# Patient Record
Sex: MALE | Race: BLACK OR AFRICAN AMERICAN | HISPANIC OR LATINO | Employment: UNEMPLOYED | URBAN - METROPOLITAN AREA
[De-identification: names, ages, dates, MRNs, and addresses within clinical notes are randomized per-mention and may not be internally consistent; named-entity substitution may affect disease eponyms.]

---

## 2020-01-10 ENCOUNTER — OFFICE VISIT (OUTPATIENT)
Dept: URGENT CARE | Facility: CLINIC | Age: 50
End: 2020-01-10
Payer: COMMERCIAL

## 2020-01-10 VITALS
TEMPERATURE: 98.6 F | HEIGHT: 65 IN | SYSTOLIC BLOOD PRESSURE: 149 MMHG | HEART RATE: 84 BPM | WEIGHT: 201 LBS | DIASTOLIC BLOOD PRESSURE: 77 MMHG | RESPIRATION RATE: 18 BRPM | BODY MASS INDEX: 33.49 KG/M2 | OXYGEN SATURATION: 97 %

## 2020-01-10 DIAGNOSIS — K40.90 RIGHT INGUINAL HERNIA: Primary | ICD-10-CM

## 2020-01-10 PROCEDURE — 99213 OFFICE O/P EST LOW 20 MIN: CPT | Performed by: PHYSICIAN ASSISTANT

## 2020-01-10 NOTE — PROGRESS NOTES
[facility]        NAME: Tom Garay is a 47 y o  male  : 1970    MRN: [id number]  DATE: January 10, 2020  TIME: 2:45 PM    Assessment and Plan   Right inguinal hernia [K40 90]  1  Right inguinal hernia       Patient Instructions     Advised ice, heat, tylenol, or motrin for discomfort  Pressure application to groin with physical activity or straining  Follow up with PCP or general surgeon in 3-5 days  Proceed to  ER if symptoms worsen  The dx, etiology, and tx plan was reviewed with pt and his significant other  All questions answered  Precautions given  Chief Complaint     Chief Complaint   Patient presents with    Abdominal Pain     RLQ abdominal pain x1 week, denies any N/V/D  Denies any urinary S/S   RLQ painful to touch with pain radiating to the leg     History of Present Illness     48 y/o male presenting with c/o right groin pain x 1 week  Pain is described as a tightness that is constant with intermittent exacerbations  No known cause for exacerbations  Pain does intermittently radiates to the right thigh and into the back  No known cause for radiation  Pain is relieved with drinking water  No other relieving or exacerbating factors  Denies dysuria, urgency, or frequency  Denies change in pain with BM, straining, coughing, walking, sitting, or lying down  No overlying skin abnormality, swelling, or deformity  No abdominal pain, N/V/D  No numbness, tingling, or weakness  No prior occurrence  No known injury  Review of Systems   Review of Systems   Constitutional: Negative for chills, diaphoresis and fever  Respiratory: Negative for cough and wheezing  Cardiovascular: Negative for chest pain and palpitations  Gastrointestinal: Negative for abdominal pain, diarrhea, nausea and vomiting  Genitourinary: Negative for discharge, dysuria, frequency, scrotal swelling, testicular pain and urgency  Skin: Negative for rash     Neurological: Negative for light-headedness and headaches  Current Medications     No current outpatient medications on file  Current Allergies     Allergies as of 01/10/2020    (No Known Allergies)          The following portions of the patient's history were reviewed and updated as appropriate: allergies, current medications, past family history, past medical history, past social history, past surgical history and problem list      History reviewed  No pertinent past medical history  History reviewed  No pertinent surgical history  History reviewed  No pertinent family history  Medications have been verified  Objective   /77   Pulse 84   Temp 98 6 °F (37 °C)   Resp 18   Ht 5' 5" (1 651 m)   Wt 91 2 kg (201 lb)   SpO2 97%   BMI 33 45 kg/m²      Physical Exam     Physical Exam   Constitutional: Vital signs are normal  He appears well-developed and well-nourished  He is cooperative  He does not appear ill  No distress  HENT:   Head: Normocephalic and atraumatic  Eyes: Conjunctivae and lids are normal  Right eye exhibits no chemosis, no discharge and no exudate  Left eye exhibits no chemosis, no discharge and no exudate  Right conjunctiva is not injected  Left conjunctiva is not injected  Cardiovascular: Normal rate, regular rhythm and normal heart sounds  Exam reveals no distant heart sounds  Pulmonary/Chest: Effort normal and breath sounds normal  No stridor  No respiratory distress  He has no decreased breath sounds  He has no wheezes  He has no rhonchi  He has no rales  Abdominal: Soft  Bowel sounds are normal  He exhibits no distension and no mass  There is no tenderness  There is no rigidity, no rebound and no guarding  A hernia is present  Hernia confirmed positive in the right inguinal area  Hernia confirmed negative in the left inguinal area  Genitourinary: Testes normal and penis normal  Cremasteric reflex is present  No penile erythema or penile tenderness  No discharge found     Lymphadenopathy: No inguinal adenopathy noted on the right or left side  Neurological: He is alert  He is not disoriented  No cranial nerve deficit  Coordination and gait normal    Skin: Skin is warm, dry and intact  No rash noted  He is not diaphoretic  No erythema  No pallor  Psychiatric: He has a normal mood and affect  His behavior is normal  Judgment and thought content normal    Nursing note and vitals reviewed

## 2020-01-10 NOTE — PATIENT INSTRUCTIONS
Follow up with your family doctor in 3-5 days  Proceed to the ER if symptoms are become constant or worsen  Hernia inguinal   LO QUE NECESITA SABER:   Sharee hernia inguinal ocurre cuando los órganos o el tejido abdominal se forzan a través de un área débil de la pared abdominal y pasan a través de ésta  La pared abdominal está formada de grasa y músculo  Eso mantiene a los intestinos en guthrie lugar  La hernia podría contener líquido, tejido del abdomen o parte de un órgano (brad un intestino)  INSTRUCCIONES SOBRE EL MARILEE HOSPITALARIA:   Regrese a la deyanira de emergencias si:   · Usted tiene un intenso dolor abdominal con náuseas y vómitos  · Guthrie abdomen está más prince de lo normal      · Guthrie hernia se hace más prince o se ve morada o john  · Usted ve tracey en mariano deposiciones  · Usted se siente mareado, débil o tiene sensación de [place]  Pregúntele a guthrie Niharika Alpha vitaminas y minerales son adecuados para usted  · Usted tiene fiebre  · Usted tiene preguntas o inquietudes acerca de guthrie condición o cuidado  Medicamento:  Usted podría  necesitar lo siguiente:  · AINEs (Analgésicos antiinflamatorios no esteroides) brad el ibuprofeno, ayudan a disminuir la inflamación, el dolor y la fiebre  Los AINEs pueden causar sangrado estomacal o problemas renales en ciertas personas  Si usted amara un medicamento anticoagulante, siempre pregúntele a guthrie médico si los PEPE son seguros para usted  Siempre joan la etiqueta de trey medicamento y [place] instrucciones  · [place] mariano medicamentos brad se le haya indicado  Consulte con guthrie médico si usted saba que guthrie medicamento no le está ayudando o si presenta efectos secundarios  Infórmele si es alérgico a cualquier medicamento  Mantenga sharee lista actualizada de los [place], las vitaminas y los productos herbales que amara  Incluya los siguientes datos de los medicamentos: cantidad, frecuencia y motivo de administración   Ermalinda Griffon con usted la lista o los envases de la píldoras a mariano citas de seguimiento  Lleve la lista de los medicamentos con usted en simone de sharee emergencia  Acuda a mariano consultas de control con marlow médico según le indicaron  Anote mariano preguntas para que se acuerde de hacerlas regino mariano visitas  Controle mariano síntomas y evite otra hernia:   · No levante nada pesado  El levantar cosas pesadas puede empeorar marlow hernia o provocar otra  Consulte con marlow médico cuánto peso puede usted levantar sin riesgo  · [address] se le haya indicado  Los líquidos podrían evitar el estreñimiento y el esfuerzo regino sharee evacuación intestinal  Pregunte cuánto líquido debe danyel cada día y cuáles líquidos son los más adecuados para usted  · [place] alimentos ricos en fibras  La fibra podría evitar el estreñimiento y el esfuerzo regino sharee evacuación intestinal  Los alimentos que contienen fibra incluyen a las frutas, verduras, frijoles, lentejas y granos enteros  · Mantenga un peso saludable  Si usted tiene sobrepeso, la pérdida de peso podría evitar que marlow hernia empeore  También podría evitar el desarrollo de otra hernia  Hable con marlow médico acerca del ejercicio y cómo perder peso de manera [place]bell si tiene sobrepeso  · No fume  La nicotina y otros químicos en los cigarrillos y los cigarros pueden debilitar marlow pared abdominal  [place] podría aumentar marlow riesgo de desarrollar otra hernia  Pida información a marlow médico si usted actualmente fuma y necesita ayuda para dejar de fumar  Los cigarrillos electrónicos o tabaco sin humo todavía contienen nicotina  Consulte con marlow médico antes de [employer]  © 2017 [address] Information is for End User's use only and may not be sold, redistributed or otherwise used for commercial purposes  All illustrations and images included in CareNotes® are the copyrighted property of A D A M , Inc  or Hugh Leone  Esta información es sólo para uso en educación   Marlow intención no es darle un consejo médico sobre enfermedades o tratamientos  Colsulte con guthrie Kermitt Console farmacéutico antes de seguir cualquier régimen médico para saber si es seguro y efectivo para usted

## 2020-11-03 ENCOUNTER — OFFICE VISIT (OUTPATIENT)
Dept: FAMILY MEDICINE CLINIC | Facility: CLINIC | Age: 50
End: 2020-11-03
Payer: COMMERCIAL

## 2020-11-03 VITALS
BODY MASS INDEX: 34.33 KG/M2 | WEIGHT: 213.6 LBS | TEMPERATURE: 97.3 F | OXYGEN SATURATION: 98 % | RESPIRATION RATE: 18 BRPM | HEART RATE: 72 BPM | HEIGHT: 66 IN | SYSTOLIC BLOOD PRESSURE: 132 MMHG | DIASTOLIC BLOOD PRESSURE: 86 MMHG

## 2020-11-03 DIAGNOSIS — Z23 ENCOUNTER FOR IMMUNIZATION: ICD-10-CM

## 2020-11-03 DIAGNOSIS — E66.9 CLASS 1 OBESITY WITH BODY MASS INDEX (BMI) OF 34.0 TO 34.9 IN ADULT, UNSPECIFIED OBESITY TYPE, UNSPECIFIED WHETHER SERIOUS COMORBIDITY PRESENT: ICD-10-CM

## 2020-11-03 DIAGNOSIS — Z12.11 SCREENING FOR COLON CANCER: Primary | ICD-10-CM

## 2020-11-03 PROCEDURE — 3008F BODY MASS INDEX DOCD: CPT | Performed by: FAMILY MEDICINE

## 2020-11-03 PROCEDURE — 90715 TDAP VACCINE 7 YRS/> IM: CPT

## 2020-11-03 PROCEDURE — 3725F SCREEN DEPRESSION PERFORMED: CPT | Performed by: FAMILY MEDICINE

## 2020-11-03 PROCEDURE — 99213 OFFICE O/P EST LOW 20 MIN: CPT | Performed by: FAMILY MEDICINE

## 2020-11-03 PROCEDURE — 90471 IMMUNIZATION ADMIN: CPT

## 2020-11-04 ENCOUNTER — TELEPHONE (OUTPATIENT)
Dept: GASTROENTEROLOGY | Facility: AMBULARY SURGERY CENTER | Age: 50
End: 2020-11-04

## 2022-11-22 ENCOUNTER — OFFICE VISIT (OUTPATIENT)
Dept: FAMILY MEDICINE CLINIC | Facility: CLINIC | Age: 52
End: 2022-11-22

## 2022-11-22 VITALS
TEMPERATURE: 97.2 F | WEIGHT: 220.13 LBS | DIASTOLIC BLOOD PRESSURE: 62 MMHG | BODY MASS INDEX: 35.38 KG/M2 | SYSTOLIC BLOOD PRESSURE: 128 MMHG | HEIGHT: 66 IN | OXYGEN SATURATION: 100 % | RESPIRATION RATE: 21 BRPM | HEART RATE: 75 BPM

## 2022-11-22 DIAGNOSIS — B35.3 TINEA PEDIS OF BOTH FEET: ICD-10-CM

## 2022-11-22 DIAGNOSIS — G44.209 TENSION HEADACHE: Primary | ICD-10-CM

## 2022-11-22 RX ORDER — MELOXICAM 7.5 MG/1
7.5 TABLET ORAL DAILY PRN
Qty: 30 TABLET | Refills: 0 | Status: SHIPPED | OUTPATIENT
Start: 2022-11-22

## 2022-11-22 NOTE — PROGRESS NOTES
Name: Deena Nichols      : 1970      MRN: [id number]  Encounter Provider: Dottie Mayes MD  Encounter Date: 2022   Encounter department: [facility]     1  Tension headache  -     meloxicam (Mobic) 7 5 mg tablet; Take 1 tablet (7 5 mg total) by mouth daily as needed for moderate pain Take with food or milk    2  Tinea pedis of both feet  -     terbinafine (LamISIL) 1 % cream; Apply topically 2 (two) times a day      Prescribed patient meloxicam p r n  for headaches given GI side effects after use with aspirin  Will monitor response and follow up in 2 weeks  Terbinafine cream prescribed for athlete's foot of both feet bilaterally  Will monitor resolution in 2 weeks, consider p o  anti-fungal if issue persists  Subjective      HPI   Headache in back of head for months  Worse when he drives  Works as a tumor   Tried Tylenol and aspirin, reports abdominal pain after ingestion  Yellowness in feet  Took a few penicillin antibiotics that have improved feet  Review of Systems   Constitutional: Negative for chills and fever  HENT: Negative for sore throat  Respiratory: Negative for cough and shortness of breath  Cardiovascular: Negative for chest pain and palpitations  Gastrointestinal: Negative for abdominal pain, constipation, diarrhea, nausea and vomiting  Genitourinary: Negative for difficulty urinating and dysuria  Neurological: Positive for headaches  Negative for dizziness  No current outpatient medications on file prior to visit  Objective     /62 (BP Location: Left arm, Patient Position: Sitting, Cuff Size: Large)   Pulse 75   Temp (!) 97 2 °F (36 2 °C) (Tympanic)   Resp 21   Ht 5' 6" (1 676 m)   Wt 99 8 kg (220 lb 2 oz)   SpO2 100%   BMI 35 53 kg/m²     Physical Exam  Constitutional:       Appearance: Normal appearance  HENT:      Head: Normocephalic and atraumatic     Cardiovascular: Rate and Rhythm: Normal rate and regular rhythm  Heart sounds: Normal heart sounds  No murmur heard  Pulmonary:      Breath sounds: Normal breath sounds  No wheezing  Abdominal:      Palpations: Abdomen is soft  Tenderness: There is no abdominal tenderness  Musculoskeletal:      Right lower leg: No edema  Left lower leg: No edema  Feet:      Right foot:      Toenail Condition: Fungal disease present  Left foot:      Toenail Condition: Fungal disease present  Neurological:      Mental Status: He is alert         Rosemarie Bowden MD

## 2023-01-06 ENCOUNTER — OFFICE VISIT (OUTPATIENT)
Dept: FAMILY MEDICINE CLINIC | Facility: CLINIC | Age: 53
End: 2023-01-06

## 2023-01-06 VITALS
RESPIRATION RATE: 18 BRPM | WEIGHT: 217.4 LBS | DIASTOLIC BLOOD PRESSURE: 64 MMHG | OXYGEN SATURATION: 100 % | SYSTOLIC BLOOD PRESSURE: 142 MMHG | HEIGHT: 66 IN | TEMPERATURE: 97.6 F | BODY MASS INDEX: 34.94 KG/M2 | HEART RATE: 74 BPM

## 2023-01-06 DIAGNOSIS — R07.9 CHEST PAIN, UNSPECIFIED TYPE: ICD-10-CM

## 2023-01-06 DIAGNOSIS — Z12.11 COLON CANCER SCREENING: ICD-10-CM

## 2023-01-06 DIAGNOSIS — G44.209 TENSION HEADACHE: ICD-10-CM

## 2023-01-06 DIAGNOSIS — E66.9 CLASS 2 OBESITY WITH BODY MASS INDEX (BMI) OF 35.0 TO 35.9 IN ADULT, UNSPECIFIED OBESITY TYPE, UNSPECIFIED WHETHER SERIOUS COMORBIDITY PRESENT: Primary | ICD-10-CM

## 2023-01-06 RX ORDER — IBUPROFEN 400 MG/1
800 TABLET ORAL EVERY 8 HOURS PRN
Qty: 90 TABLET | Refills: 0 | Status: SHIPPED | OUTPATIENT
Start: 2023-01-06

## 2023-01-06 RX ORDER — ACETAMINOPHEN 500 MG
1000 TABLET ORAL EVERY 6 HOURS PRN
Qty: 90 TABLET | Refills: 0 | Status: SHIPPED | OUTPATIENT
Start: 2023-01-06 | End: 2023-02-05

## 2023-01-06 NOTE — PATIENT INSTRUCTIONS
Headache treatment    - when you have a headache take up to 1 gram (or 1000 milligrams) of Tylenol/Acetaminophen (do not exceed 4 grams in one day) and up to 800 mg of Motrin/Ibuprofen (do not exceed 2400 mg in one day)  Both medications can be taken at the same time  Take with food  Obesity   AMBULATORY CARE:   Obesity  means your body mass index (BMI) is greater than 30  Your healthcare provider will use your height and weight to measure your BMI  The risks of obesity include  many health problems, including injuries or physical disability  Diabetes (high blood sugar level)    High blood pressure or high cholesterol    Heart disease    Stroke    Gallbladder or liver disease    Cancer of the colon, breast, prostate, liver, or kidney    Sleep apnea    Arthritis or gout    Screening  is done to check for health conditions before you have signs or symptoms  If you are 28to 79years old, your blood sugar level may be checked every 3 years for signs of prediabetes or diabetes  Your healthcare provider will check your blood pressure at each visit  High blood pressure can lead to a stroke or other problems  Your provider may check for signs of heart disease, cancer, or other health problems  Seek care immediately if:   You have a severe headache, confusion, or difficulty speaking  You have weakness on one side of your body  You have chest pain, sweating, or shortness of breath  Call your doctor if:   You have symptoms of gallbladder or liver disease, such as pain in your upper abdomen  You have knee or hip pain and discomfort while walking  You have symptoms of diabetes, such as intense hunger and thirst, and frequent urination  You have symptoms of sleep apnea, such as snoring or daytime sleepiness  You have questions or concerns about your condition or care  Treatment for obesity  focuses on helping you lose weight to improve your health   Even a small decrease in BMI can reduce the risk for many health problems  Your healthcare provider will help you set a weight-loss goal   Lifestyle changes  are the first step in treating obesity  These include making healthy food choices and getting regular physical activity  Your healthcare provider may suggest a weight-loss program that involves coaching, education, and therapy  Medicine  may help you lose weight when it is used with a healthy foods and physical activity  Surgery  can help you lose weight if you are very obese and have other health problems  There are several types of weight-loss surgery  Ask your healthcare provider for more information  Tips for safe weight loss:   Set small, realistic goals  An example of a small goal is to walk for 20 minutes 5 days a week  Anther goal is to lose 5% of your body weight  Tell friends, family members, and coworkers about your goals  and ask for their support  Ask a friend to lose weight with you, or join a weight-loss support group  Identify foods or triggers that may cause you to overeat , and find ways to avoid them  Remove tempting high-calorie foods from your home and workplace  Place a bowl of fresh fruit on your kitchen counter  If stress causes you to eat, then find other ways to cope with stress  A counselor or therapist may be able to help you  Keep a diary to track what you eat and drink  Also write down how many minutes of physical activity you do each day  Weigh yourself once a week and record it in your diary  Eating changes: You will need to eat 500 to 1,000 fewer calories each day than you currently eat to lose 1 to 2 pounds a week  The following changes will help you cut calories:  Eat smaller portions  Use small plates, no larger than 9 inches in diameter  Fill your plate half full of fruits and vegetables  Measure your food using measuring cups until you know what a serving size looks like  Eat 3 meals and 1 or 2 snacks each day    Plan your meals in advance  Marielena Mondraogn and eat at home most of the time  Eat slowly  Do not skip meals  Skipping meals can lead to overeating later in the day  This can make it harder for you to lose weight  Talk with a dietitian to help you make a meal plan and schedule that is right for you  Eat fruits and vegetables at every meal   They are low in calories and high in fiber, which makes you feel full  Do not add butter, margarine, or cream sauce to vegetables  Use herbs to season steamed vegetables  Eat less fat and fewer fried foods  Eat more baked or grilled chicken and fish  These protein sources are lower in calories and fat than red meat  Limit fast food  Dress your salads with olive oil and vinegar instead of bottled dressing  Limit the amount of sugar you eat  Do not drink sugary beverages  Limit alcohol  Activity changes:  Physical activity is good for your body in many ways  It helps you burn calories and build strong muscles  It decreases stress and depression, and improves your mood  It can also help you sleep better  Talk to your healthcare provider before you begin an exercise program   Exercise for at least 30 minutes 5 days a week  Start slowly  Set aside time each day for physical activity that you enjoy and that is convenient for you  It is best to do both weight training and an activity that increases your heart rate, such as walking, bicycling, or swimming  Find ways to be more active  Do yard work and housecleaning  Walk up the stairs instead of using elevators  Spend your leisure time going to events that require walking, such as outdoor festivals or fairs  This extra physical activity can help you lose weight and keep it off  Follow up with your doctor as directed: You may need to meet with a dietitian  Write down your questions so you remember to ask them during your visits    © Copyright [employer] 2022 Information is for End User's use only and may not be sold, redistributed or otherwise used for commercial purposes  All illustrations and images included in CareNotes® are the copyrighted property of A D A M , Inc  or Dayne [address]  The above information is an  only  It is not intended as medical advice for individual conditions or treatments  Talk to your doctor, nurse or pharmacist before following any medical regimen to see if it is safe and effective for you

## 2023-01-06 NOTE — PROGRESS NOTES
[facility] Office visit    Assessment/Plan:     1  Class 2 obesity with body mass index (BMI) of 35 0 to 35 9 in adult, unspecified obesity type, unspecified whether serious comorbidity present    No formal dieting or regular exercise  Counseling and handout provided    -     Lipid Panel with Direct LDL reflex; Future  -     Basic metabolic panel; Future  -     HEMOGLOBIN A1C W/ EAG ESTIMATION; Future  -     Ambulatory Referral to Nutrition Services; Future      2  Colon cancer screening  -     Ambulatory referral for colonoscopy; Future    3  Tension headache          -     acetaminophen (TYLENOL) 500 mg tablet; Take 2 tablets (1,000 mg total) by mouth every 6 (six) hours as needed for headaches  -     ibuprofen (MOTRIN) 400 mg tablet; Take 2 tablets (800 mg total) by mouth every 8 (eight) hours as needed for headaches    4  Chest pain, unspecified type  -     Stress test only, exercise; Future; Expected date: 01/06/2023          Return in about 4 weeks (around 2/3/2023) for OVL folluw up headaches, obesity, chest pain  Subjective:   OCTAVIA Vaca is a 47 y o  male who was scheduled for a physical, but we opted to postpone that to address multiple issues  He reports right occipital headache lasting about 1 hr, 3 times per week, without associated sensitivity to light or sound or visual disturbances  He's not sure what triggers them, he has tried mobic with partial relief  He also complains of left-sided chest pain that has been going on for several months which is worsened with activity and relieved by rest  He denies any associated SOB  He denies any injury or any association with food  He denies an cardiac history  Review of Systems   Constitutional: Negative for chills and fever  HENT: Negative for congestion  Respiratory: Negative for cough and shortness of breath  Cardiovascular: Positive for chest pain  Negative for palpitations     Gastrointestinal: Negative for diarrhea, nausea and vomiting  Neurological: Positive for headaches (right occipital)  Negative for dizziness, light-headedness and numbness  Objective:     /64 (BP Location: Left arm, Patient Position: Sitting, Cuff Size: Adult)   Pulse 74   Temp 97 6 °F (36 4 °C) (Tympanic)   Resp 18   Ht 5' 6" (1 676 m)   Wt 98 6 kg (217 lb 6 4 oz)   SpO2 100%   BMI 35 09 kg/m²      Physical Exam  Constitutional:       General: He is not in acute distress  Appearance: He is obese  He is not ill-appearing or toxic-appearing  HENT:      Head: Normocephalic  Cardiovascular:      Rate and Rhythm: Normal rate and regular rhythm  Heart sounds: Normal heart sounds  No murmur heard  Pulmonary:      Effort: Pulmonary effort is normal       Breath sounds: Normal breath sounds  Musculoskeletal:      Right lower leg: No edema  Left lower leg: No edema  Comments: No chest tenderness or replication of chest pain with activation of pectoral muscles   Skin:     Capillary Refill: Capillary refill takes less than 2 seconds  Neurological:      Mental Status: He is alert and oriented to person, place, and time            ** Please Note: This note has been constructed using a voice recognition system **     Tito Suresh MD  01/08/23  10:07 PM

## 2023-02-06 ENCOUNTER — TELEPHONE (OUTPATIENT)
Dept: OTHER | Facility: OTHER | Age: 53
End: 2023-02-06

## 2023-02-09 ENCOUNTER — OFFICE VISIT (OUTPATIENT)
Dept: FAMILY MEDICINE CLINIC | Facility: CLINIC | Age: 53
End: 2023-02-09

## 2023-02-09 ENCOUNTER — OFFICE VISIT (OUTPATIENT)
Dept: GASTROENTEROLOGY | Facility: CLINIC | Age: 53
End: 2023-02-09

## 2023-02-09 VITALS
SYSTOLIC BLOOD PRESSURE: 128 MMHG | WEIGHT: 216.4 LBS | HEIGHT: 66 IN | DIASTOLIC BLOOD PRESSURE: 80 MMHG | BODY MASS INDEX: 34.78 KG/M2 | HEART RATE: 75 BPM

## 2023-02-09 VITALS
HEART RATE: 88 BPM | BODY MASS INDEX: 34.72 KG/M2 | OXYGEN SATURATION: 99 % | SYSTOLIC BLOOD PRESSURE: 114 MMHG | TEMPERATURE: 97.4 F | RESPIRATION RATE: 21 BRPM | HEIGHT: 66 IN | DIASTOLIC BLOOD PRESSURE: 76 MMHG | WEIGHT: 216.06 LBS

## 2023-02-09 DIAGNOSIS — E66.9 CLASS 1 OBESITY WITH BODY MASS INDEX (BMI) OF 34.0 TO 34.9 IN ADULT, UNSPECIFIED OBESITY TYPE, UNSPECIFIED WHETHER SERIOUS COMORBIDITY PRESENT: Primary | ICD-10-CM

## 2023-02-09 DIAGNOSIS — K21.9 GASTROESOPHAGEAL REFLUX DISEASE, UNSPECIFIED WHETHER ESOPHAGITIS PRESENT: ICD-10-CM

## 2023-02-09 DIAGNOSIS — Z12.11 COLON CANCER SCREENING: Primary | ICD-10-CM

## 2023-02-09 DIAGNOSIS — R07.89 ATYPICAL CHEST PAIN: ICD-10-CM

## 2023-02-09 RX ORDER — OMEPRAZOLE 40 MG/1
40 CAPSULE, DELAYED RELEASE ORAL DAILY
Qty: 90 CAPSULE | Refills: 0 | Status: SHIPPED | OUTPATIENT
Start: 2023-02-09

## 2023-02-09 NOTE — H&P (VIEW-ONLY)
[facility] - Outpatient Consultation  Shea Cedeno 47 y o  male MRN: [id number]  Encounter: [id number]          ASSESSMENT AND PLAN:      #1   Colorectal cancer screening, patient appears at average colorectal cancer risk and has never had colonoscopy, rule out adenomatous polyps or malignancy    -We will plan for colonoscopy, in addition to EGD    -Procedures were explained in detail to the patient at this time including associated risks and benefits, risks including but not limited to infection, perforation and bleeding    -Prescription and instructions provided for colonoscopy prep    -Also follow-up on patient's upcoming nuclear stress test; if it does actually show evidence of any significant cardiac pathology then we may need to defer/postpone endoscopic evaluation      #2  Atypical chest pain, episodic, does not appear to be obviously exertional in nature upon my current questioning of the patient, he is ordered for nuclear stress test which is to be done within the next couple of days    Also does endorse some burning quality to the pain as well as some relief with taking Tums and milk, suspect this may be related to GERD, rule out underlying hiatal hernia, Marina's esophagus, malignancy, esophageal ulcer    -We will plan for EGD at the same time as colonoscopy, again procedures were explained to the patient at this time    -For the meantime we will start omeprazole 40 mg once daily; will decide about continuing/modifying/tapering medication pending patient's clinical course and findings from the EGD    -Recommend avoidance of dietary GERD triggers  ______________________________________________________________________    HPI: 48-year-old male with no reported significant medical history who presents for evaluation, he says he was recommended to have colon cancer screening by his primary doctor, he says he has never had a colonoscopy before, denies any known family history of colon cancer, and denies any disturbances to his bowel habits or any unexpected changes in his weight  Separately, he had also been having complaints of intermittent headaches and has been taking NSAIDs intermittently, though he switched to Tylenol last week  He says he also been having episodes of left-sided and substernal chest pain which he tells me is not exertional in nature or associated with food intake (office note from PCP in January suggest it actually was exertional in nature), he tells me he does get epigastric and substernal burning sensation during the night as well, he also says that it was helped to some degree by taking Tums, as well as drinking milk  He has never had EGD, and does not take any acid reducing medications  He says he is also currently planned for nuclear stress test       REVIEW OF SYSTEMS:    CONSTITUTIONAL: Denies any fever, chills, rigors, and weight loss  HEENT: No earache or tinnitus  Denies hearing loss or visual disturbances  CARDIOVASCULAR: No chest pain or palpitations  RESPIRATORY: Denies any cough, hemoptysis, shortness of breath or dyspnea on exertion  GASTROINTESTINAL: As noted in the History of Present Illness  GENITOURINARY: No problems with urination  Denies any hematuria or dysuria  NEUROLOGIC: No dizziness or vertigo, denies headaches  MUSCULOSKELETAL: Denies any muscle or joint pain  SKIN: Denies skin rashes or itching  ENDOCRINE: Denies excessive thirst  Denies intolerance to heat or cold  PSYCHOSOCIAL: Denies depression or anxiety  Denies any recent memory loss  Historical Information   History reviewed  No pertinent past medical history  History reviewed  No pertinent surgical history    Social History   Social History     Substance and Sexual Activity   Alcohol Use Yes     Social History     Substance and Sexual Activity   Drug Use Never     Social History     Tobacco Use   Smoking Status Never   Smokeless Tobacco Never     History reviewed  No pertinent family history  Meds/Allergies       Current Outpatient Medications:   •  ibuprofen (MOTRIN) 400 mg tablet  •  meloxicam (Mobic) 7 5 mg tablet  •  terbinafine (LamISIL) 1 % cream    No Known Allergies        Objective     Blood pressure 128/80, pulse 75, height 5' 6" (1 676 m), weight 98 2 kg (216 lb 6 4 oz)  Body mass index is 34 93 kg/m²  PHYSICAL EXAM:      General Appearance:   Alert, cooperative, no distress   HEENT:   Normocephalic, atraumatic, anicteric      Neck:  Supple, symmetrical, trachea midline   Lungs:   Clear to auscultation bilaterally; no rales, rhonchi or wheezing; respirations unlabored    Heart[de-identified]   Regular rate and rhythm; no murmur, rub, or gallop  Abdomen:   Soft, non-tender, non-distended; normal bowel sounds; no masses, no organomegaly    Genitalia:   Deferred    Rectal:   Deferred    Extremities:  No cyanosis, clubbing or edema    Pulses:  2+ and symmetric    Skin:  No jaundice, rashes, or lesions    Lymph nodes:  No palpable cervical lymphadenopathy        Lab Results:   No visits with results within 1 Day(s) from this visit  Latest known visit with results is:   No results found for any previous visit  Radiology Results:   No results found

## 2023-02-09 NOTE — PROGRESS NOTES
[facility] - Outpatient Consultation  Kami Roberts 47 y o  male MRN: [id number]  Encounter: [id number]          ASSESSMENT AND PLAN:      #1   Colorectal cancer screening, patient appears at average colorectal cancer risk and has never had colonoscopy, rule out adenomatous polyps or malignancy    -We will plan for colonoscopy, in addition to EGD    -Procedures were explained in detail to the patient at this time including associated risks and benefits, risks including but not limited to infection, perforation and bleeding    -Prescription and instructions provided for colonoscopy prep    -Also follow-up on patient's upcoming nuclear stress test; if it does actually show evidence of any significant cardiac pathology then we may need to defer/postpone endoscopic evaluation      #2  Atypical chest pain, episodic, does not appear to be obviously exertional in nature upon my current questioning of the patient, he is ordered for nuclear stress test which is to be done within the next couple of days    Also does endorse some burning quality to the pain as well as some relief with taking Tums and milk, suspect this may be related to GERD, rule out underlying hiatal hernia, Marina's esophagus, malignancy, esophageal ulcer    -We will plan for EGD at the same time as colonoscopy, again procedures were explained to the patient at this time    -For the meantime we will start omeprazole 40 mg once daily; will decide about continuing/modifying/tapering medication pending patient's clinical course and findings from the EGD    -Recommend avoidance of dietary GERD triggers  ______________________________________________________________________    HPI: 27-year-old male with no reported significant medical history who presents for evaluation, he says he was recommended to have colon cancer screening by his primary doctor, he says he has never had a colonoscopy before, denies any known family history of colon cancer, and denies any disturbances to his bowel habits or any unexpected changes in his weight  Separately, he had also been having complaints of intermittent headaches and has been taking NSAIDs intermittently, though he switched to Tylenol last week  He says he also been having episodes of left-sided and substernal chest pain which he tells me is not exertional in nature or associated with food intake (office note from PCP in January suggest it actually was exertional in nature), he tells me he does get epigastric and substernal burning sensation during the night as well, he also says that it was helped to some degree by taking Tums, as well as drinking milk  He has never had EGD, and does not take any acid reducing medications  He says he is also currently planned for nuclear stress test       REVIEW OF SYSTEMS:    CONSTITUTIONAL: Denies any fever, chills, rigors, and weight loss  HEENT: No earache or tinnitus  Denies hearing loss or visual disturbances  CARDIOVASCULAR: No chest pain or palpitations  RESPIRATORY: Denies any cough, hemoptysis, shortness of breath or dyspnea on exertion  GASTROINTESTINAL: As noted in the History of Present Illness  GENITOURINARY: No problems with urination  Denies any hematuria or dysuria  NEUROLOGIC: No dizziness or vertigo, denies headaches  MUSCULOSKELETAL: Denies any muscle or joint pain  SKIN: Denies skin rashes or itching  ENDOCRINE: Denies excessive thirst  Denies intolerance to heat or cold  PSYCHOSOCIAL: Denies depression or anxiety  Denies any recent memory loss  Historical Information   History reviewed  No pertinent past medical history  History reviewed  No pertinent surgical history    Social History   Social History     Substance and Sexual Activity   Alcohol Use Yes     Social History     Substance and Sexual Activity   Drug Use Never     Social History     Tobacco Use   Smoking Status Never   Smokeless Tobacco Never     History reviewed  No pertinent family history  Meds/Allergies       Current Outpatient Medications:   •  ibuprofen (MOTRIN) 400 mg tablet  •  meloxicam (Mobic) 7 5 mg tablet  •  terbinafine (LamISIL) 1 % cream    No Known Allergies        Objective     Blood pressure 128/80, pulse 75, height 5' 6" (1 676 m), weight 98 2 kg (216 lb 6 4 oz)  Body mass index is 34 93 kg/m²  PHYSICAL EXAM:      General Appearance:   Alert, cooperative, no distress   HEENT:   Normocephalic, atraumatic, anicteric      Neck:  Supple, symmetrical, trachea midline   Lungs:   Clear to auscultation bilaterally; no rales, rhonchi or wheezing; respirations unlabored    Heart[de-identified]   Regular rate and rhythm; no murmur, rub, or gallop  Abdomen:   Soft, non-tender, non-distended; normal bowel sounds; no masses, no organomegaly    Genitalia:   Deferred    Rectal:   Deferred    Extremities:  No cyanosis, clubbing or edema    Pulses:  2+ and symmetric    Skin:  No jaundice, rashes, or lesions    Lymph nodes:  No palpable cervical lymphadenopathy        Lab Results:   No visits with results within 1 Day(s) from this visit  Latest known visit with results is:   No results found for any previous visit  Radiology Results:   No results found

## 2023-02-10 ENCOUNTER — TELEPHONE (OUTPATIENT)
Dept: OTHER | Facility: OTHER | Age: 53
End: 2023-02-10

## 2023-02-10 DIAGNOSIS — Z12.12 ENCOUNTER FOR COLORECTAL CANCER SCREENING: Primary | ICD-10-CM

## 2023-02-10 DIAGNOSIS — Z12.11 ENCOUNTER FOR COLORECTAL CANCER SCREENING: Primary | ICD-10-CM

## 2023-02-10 LAB
BUN SERPL-MCNC: 10 MG/DL (ref 6–24)
BUN/CREAT SERPL: 10 (ref 9–20)
CALCIUM SERPL-MCNC: 9.6 MG/DL (ref 8.7–10.2)
CHLORIDE SERPL-SCNC: 100 MMOL/L (ref 96–106)
CHOLEST SERPL-MCNC: 151 MG/DL (ref 100–199)
CO2 SERPL-SCNC: 23 MMOL/L (ref 20–29)
CREAT SERPL-MCNC: 1 MG/DL (ref 0.76–1.27)
EGFR: 90 ML/MIN/1.73
EST. AVERAGE GLUCOSE BLD GHB EST-MCNC: 111 MG/DL
GLUCOSE SERPL-MCNC: 87 MG/DL (ref 70–99)
HBA1C MFR BLD: 5.5 % (ref 4.8–5.6)
HDLC SERPL-MCNC: 30 MG/DL
LDLC SERPL CALC-MCNC: 100 MG/DL (ref 0–99)
LDLC/HDLC SERPL: 3.3 RATIO (ref 0–3.6)
POTASSIUM SERPL-SCNC: 4.2 MMOL/L (ref 3.5–5.2)
SL AMB VLDL CHOLESTEROL CALC: 21 MG/DL (ref 5–40)
SODIUM SERPL-SCNC: 139 MMOL/L (ref 134–144)
TRIGL SERPL-MCNC: 116 MG/DL (ref 0–149)

## 2023-02-10 NOTE — TELEPHONE ENCOUNTER
Ordered PEG 3350 which states do not substitute  They however are unable to get that one in  They can get in [address]  Can they substitute this

## 2023-02-12 NOTE — PROGRESS NOTES
[facility] Office visit    Assessment/Plan:     1  Class 1 obesity with body mass index (BMI) of 34 0 to 34 9 in adult, unspecified obesity type, unspecified whether serious comorbidity present    Weight today of 216 pounds with a BMI of 34 87, down from 220 pounds on 11/22/2023    - Continue dieting and exercise      2  Gastroesophageal reflux disease, unspecified whether esophagitis present    At last visit patient had complained of chest pain that is sometimes exertional   But his chest pain his since resolved with Tums and milk  He has since seen GI who started him on omeprazole 40 mg daily  Patient was unaware that he is supposed to start omeprazole and I clarified that he should start that per GI recommendations  GI plans do a EGD with his colonoscopy  3   Chest pain-resolved  -Continue with scheduled stress test       Return in about 2 months (around 4/9/2023) for Annual physical      Subjective:   OCTAVIA Choudhury is a 47 y o  male who presents to follow-up on chest pain and obesity  He reports that his personal chest pain have resolved  He is scheduled for a stress test on 2/13/2023  He reports intentional weight loss which he attributes to dieting  Review of Systems   Constitutional: Negative for chills and fever  Respiratory: Negative for shortness of breath  Cardiovascular: Negative for chest pain  Gastrointestinal: Negative for abdominal pain, diarrhea, nausea and vomiting  Objective:     /76 (BP Location: Left arm, Patient Position: Sitting, Cuff Size: Large)   Pulse 88   Temp (!) 97 4 °F (36 3 °C) (Temporal)   Resp 21   Ht 5' 6" (1 676 m)   Wt 98 kg (216 lb 1 oz)   SpO2 99%   BMI 34 87 kg/m²      Physical Exam  Constitutional:       General: He is not in acute distress  Appearance: He is obese  He is not ill-appearing, toxic-appearing or diaphoretic  HENT:      Head: Normocephalic     Cardiovascular:      Rate and Rhythm: Normal rate and regular rhythm  Heart sounds: Normal heart sounds  No murmur heard  Pulmonary:      Effort: Pulmonary effort is normal  No respiratory distress  Breath sounds: Normal breath sounds  Chest:      Chest wall: No tenderness  Abdominal:      General: Bowel sounds are normal       Tenderness: There is no abdominal tenderness  Skin:     Capillary Refill: Capillary refill takes less than 2 seconds  Neurological:      Mental Status: He is alert and oriented to person, place, and time            ** Please Note: This note has been constructed using a voice recognition system **     Mukul Evangelista MD  02/11/23  11:00 PM

## 2023-02-13 ENCOUNTER — HOSPITAL ENCOUNTER (OUTPATIENT)
Dept: NON INVASIVE DIAGNOSTICS | Facility: HOSPITAL | Age: 53
Discharge: HOME | End: 2023-02-13

## 2023-02-13 DIAGNOSIS — R07.9 CHEST PAIN, UNSPECIFIED TYPE: ICD-10-CM

## 2023-02-13 LAB
CHEST PAIN STATEMENT: NORMAL
MAX DIASTOLIC BP: 90 MMHG
MAX HEART RATE: 150 BPM
MAX PREDICTED HEART RATE: 167 BPM
MAX. SYSTOLIC BP: 180 MMHG
PROTOCOL NAME: NORMAL
TARGET HR FORMULA: NORMAL
TEST INDICATION: NORMAL
TIME IN EXERCISE PHASE: NORMAL

## 2023-02-14 LAB
MAX HR PERCENT: 89 %
MAX HR: 150 BPM
RATE PRESSURE PRODUCT: NORMAL
SL CV STRESS RECOVERY BP: NORMAL MMHG
SL CV STRESS RECOVERY HR: 103 BPM
SL CV STRESS RECOVERY O2 SAT: 98 %
STRESS ANGINA INDEX: 0
STRESS BASELINE BP: NORMAL MMHG
STRESS BASELINE HR: 82 BPM
STRESS DUKE TREADMILL SCORE: 7
STRESS O2 SAT REST: 99 %
STRESS PEAK HR: 148 BPM
STRESS POST ESTIMATED WORKLOAD: 9.1 METS
STRESS POST EXERCISE DUR MIN: 6 MIN
STRESS POST EXERCISE DUR SEC: 41 SEC
STRESS POST O2 SAT PEAK: 96 %
STRESS POST PEAK BP: 180 MMHG
STRESS ST DEPRESSION: 0 MM

## 2023-02-28 ENCOUNTER — ANESTHESIA EVENT (OUTPATIENT)
Dept: GASTROENTEROLOGY | Facility: AMBULARY SURGERY CENTER | Age: 53
End: 2023-02-28

## 2023-02-28 ENCOUNTER — HOSPITAL ENCOUNTER (OUTPATIENT)
Dept: GASTROENTEROLOGY | Facility: AMBULARY SURGERY CENTER | Age: 53
Setting detail: OUTPATIENT SURGERY
Discharge: HOME | End: 2023-02-28
Attending: INTERNAL MEDICINE

## 2023-02-28 ENCOUNTER — ANESTHESIA (OUTPATIENT)
Dept: GASTROENTEROLOGY | Facility: AMBULARY SURGERY CENTER | Age: 53
End: 2023-02-28

## 2023-02-28 VITALS
DIASTOLIC BLOOD PRESSURE: 75 MMHG | OXYGEN SATURATION: 98 % | TEMPERATURE: 97.5 F | SYSTOLIC BLOOD PRESSURE: 134 MMHG | RESPIRATION RATE: 18 BRPM | HEART RATE: 77 BPM

## 2023-02-28 DIAGNOSIS — Z12.11 COLON CANCER SCREENING: ICD-10-CM

## 2023-02-28 DIAGNOSIS — R07.89 ATYPICAL CHEST PAIN: ICD-10-CM

## 2023-02-28 DIAGNOSIS — K21.9 GASTROESOPHAGEAL REFLUX DISEASE, UNSPECIFIED WHETHER ESOPHAGITIS PRESENT: ICD-10-CM

## 2023-02-28 RX ORDER — PROPOFOL 10 MG/ML
INJECTION, EMULSION INTRAVENOUS CONTINUOUS PRN
Status: DISCONTINUED | OUTPATIENT
Start: 2023-02-28 | End: 2023-02-28

## 2023-02-28 RX ORDER — SODIUM CHLORIDE, SODIUM LACTATE, POTASSIUM CHLORIDE, CALCIUM CHLORIDE 600; 310; 30; 20 MG/100ML; MG/100ML; MG/100ML; MG/100ML
INJECTION, SOLUTION INTRAVENOUS CONTINUOUS PRN
Status: DISCONTINUED | OUTPATIENT
Start: 2023-02-28 | End: 2023-02-28

## 2023-02-28 RX ORDER — PROPOFOL 10 MG/ML
INJECTION, EMULSION INTRAVENOUS AS NEEDED
Status: DISCONTINUED | OUTPATIENT
Start: 2023-02-28 | End: 2023-02-28

## 2023-02-28 RX ORDER — SODIUM CHLORIDE, SODIUM LACTATE, POTASSIUM CHLORIDE, CALCIUM CHLORIDE 600; 310; 30; 20 MG/100ML; MG/100ML; MG/100ML; MG/100ML
125 INJECTION, SOLUTION INTRAVENOUS CONTINUOUS
Status: DISCONTINUED | OUTPATIENT
Start: 2023-02-28 | End: 2023-03-04 | Stop reason: HOSPADM

## 2023-02-28 RX ORDER — SODIUM CHLORIDE, SODIUM LACTATE, POTASSIUM CHLORIDE, CALCIUM CHLORIDE 600; 310; 30; 20 MG/100ML; MG/100ML; MG/100ML; MG/100ML
125 INJECTION, SOLUTION INTRAVENOUS CONTINUOUS
Status: CANCELLED | OUTPATIENT
Start: 2023-02-28

## 2023-02-28 RX ORDER — OMEPRAZOLE 40 MG/1
40 CAPSULE, DELAYED RELEASE ORAL 2 TIMES DAILY
Qty: 180 CAPSULE | Refills: 0 | Status: SHIPPED | OUTPATIENT
Start: 2023-02-28 | End: 2023-05-29

## 2023-02-28 RX ORDER — LIDOCAINE HYDROCHLORIDE 10 MG/ML
INJECTION, SOLUTION EPIDURAL; INFILTRATION; INTRACAUDAL; PERINEURAL AS NEEDED
Status: DISCONTINUED | OUTPATIENT
Start: 2023-02-28 | End: 2023-02-28

## 2023-02-28 RX ADMIN — SODIUM CHLORIDE, SODIUM LACTATE, POTASSIUM CHLORIDE, AND CALCIUM CHLORIDE 125 ML/HR: .6; .31; .03; .02 INJECTION, SOLUTION INTRAVENOUS at 11:53

## 2023-02-28 RX ADMIN — PROPOFOL 50 MG: 10 INJECTION, EMULSION INTRAVENOUS at 12:24

## 2023-02-28 RX ADMIN — SODIUM CHLORIDE, SODIUM LACTATE, POTASSIUM CHLORIDE, AND CALCIUM CHLORIDE: .6; .31; .03; .02 INJECTION, SOLUTION INTRAVENOUS at 12:12

## 2023-02-28 RX ADMIN — PROPOFOL 50 MG: 10 INJECTION, EMULSION INTRAVENOUS at 12:23

## 2023-02-28 RX ADMIN — PROPOFOL 50 MG: 10 INJECTION, EMULSION INTRAVENOUS at 12:25

## 2023-02-28 RX ADMIN — PROPOFOL 100 MCG/KG/MIN: 10 INJECTION, EMULSION INTRAVENOUS at 12:25

## 2023-02-28 RX ADMIN — PROPOFOL 150 MG: 10 INJECTION, EMULSION INTRAVENOUS at 12:19

## 2023-02-28 RX ADMIN — LIDOCAINE HYDROCHLORIDE 50 MG: 10 INJECTION, SOLUTION EPIDURAL; INFILTRATION; INTRACAUDAL; PERINEURAL at 12:18

## 2023-02-28 RX ADMIN — PROPOFOL 50 MG: 10 INJECTION, EMULSION INTRAVENOUS at 12:21

## 2023-02-28 NOTE — INTERVAL H&P NOTE
H&P reviewed  After examining the patient I find no changes in the patients condition since the H&P had been written      Vitals:    02/28/23 1145   BP: 130/84   Pulse: 81   Resp: 18   Temp: 97 5 °F (36 4 °C)   SpO2: 98%

## 2023-02-28 NOTE — ANESTHESIA POSTPROCEDURE EVALUATION
Post-Op Assessment Note    CV Status:  Stable  Pain Score: 0    Pain management: adequate     Mental Status:  Alert   Hydration Status:  Stable   PONV Controlled:  None   Airway Patency:  Patent   Two or more mitigation strategies used for obstructive sleep apnea   Post Op Vitals Reviewed: Yes      Staff: CRNA         No notable events documented      /64 (02/28/23 1253)    Temp      Pulse 92 (02/28/23 1253)   Resp      SpO2 95 % (02/28/23 1253)

## 2023-02-28 NOTE — ANESTHESIA PREPROCEDURE EVALUATION
Procedure:  COLONOSCOPY  EGD    Relevant Problems   No relevant active problems   obesity   Colon cancer screening [Z12 11]   Gastroesophageal reflux disease, unspecified whether esophagitis present [K21 9]   Atypical chest pain [R07 89]    Physical Exam    Airway    Mallampati score: II  TM Distance: >3 FB  Neck ROM: full     Dental   Comment: Denies loose teeth  Upper and lower partial removed,     Cardiovascular  Cardiovascular exam normal    Pulmonary  Pulmonary exam normal     Other Findings  Portions of exam deferred due to low yield and/or unknown COVID status      Anesthesia Plan  ASA Score- 2     Anesthesia Type- IV sedation with anesthesia with ASA Monitors  Additional Monitors:   Airway Plan:           Plan Factors-Exercise tolerance (METS): >4 METS  Chart reviewed  Existing labs reviewed  Patient summary reviewed  Patient is not a current smoker  Induction- intravenous  Postoperative Plan-     Informed Consent- Anesthetic plan and risks discussed with patient  I personally reviewed this patient with the CRNA  Discussed and agreed on the Anesthesia Plan with the CRNA  Jose Robertson

## 2023-03-13 ENCOUNTER — TELEPHONE (OUTPATIENT)
Dept: GASTROENTEROLOGY | Facility: AMBULARY SURGERY CENTER | Age: 53
End: 2023-03-13

## 2023-03-13 NOTE — TELEPHONE ENCOUNTER
----- Message from Oscar Collet, LPN sent at 6/7/2192  3:28 PM EST -----    ----- Message -----  From: Tracie Garsia MD  Sent: 3/8/2023   3:19 PM EST  To: Gastroenterology [facility], #    Please call patient with results -H  pylori gastritis, please treat    Biopsies of the small intestine and esophagus are otherwise normal

## 2023-04-24 ENCOUNTER — TELEPHONE (OUTPATIENT)
Dept: GASTROENTEROLOGY | Facility: AMBULARY SURGERY CENTER | Age: 53
End: 2023-04-24

## 2023-04-25 ENCOUNTER — TELEPHONE (OUTPATIENT)
Dept: GASTROENTEROLOGY | Facility: AMBULARY SURGERY CENTER | Age: 53
End: 2023-04-25

## 2023-06-29 ENCOUNTER — TELEPHONE (OUTPATIENT)
Dept: FAMILY MEDICINE CLINIC | Facility: CLINIC | Age: 53
End: 2023-06-29

## 2023-06-29 NOTE — TELEPHONE ENCOUNTER
Patient is in office wanting a refill for Tylenol  He saying that he was given a RX for it by us      Preferred pharmacy: [facility] [place]

## 2024-06-12 ENCOUNTER — PREP FOR PROCEDURE (OUTPATIENT)
Dept: GASTROENTEROLOGY | Facility: AMBULARY SURGERY CENTER | Age: 54
End: 2024-06-12

## 2024-06-12 DIAGNOSIS — Z12.11 SCREENING FOR COLON CANCER: Primary | ICD-10-CM

## 2024-06-12 DIAGNOSIS — K25.9 GASTRIC ULCER, UNSPECIFIED CHRONICITY, UNSPECIFIED WHETHER GASTRIC ULCER HEMORRHAGE OR PERFORATION PRESENT: ICD-10-CM

## 2025-02-01 NOTE — PROGRESS NOTES
Name: Eh Pearce      : 1970      MRN: [id number]  Encounter Provider: Arianna Gunter MD  Encounter Date: 2/3/2025   Encounter department: [facility]  :  Assessment & Plan  Gastroesophageal reflux disease without esophagitis  Cough and chest discomfort most likely due to acid reflux. Pt has history of gastritis with ulcers. Stopped taking omeprazole several months ago. Pt was supposed to have follow up EGD in 9 months ago. Pt chest discomfort was relieved by vomiting.    Will restart omeprazole 40mg BID and refer back to GI for EGD   Orders:    Ambulatory Referral to Gastroenterology; Future    omeprazole (PriLOSEC) 40 MG capsule; Take 1 capsule (40 mg total) by mouth 2 (two) times a day    Need for hepatitis C screening test    Orders:    Hepatitis C Antibody; Future    Screening for HIV (human immunodeficiency virus)    Orders:    HIV 1/2 Antigen/Antibody (Fourth Generation) with Reflex Testing ([facility], [facility], or EXTERNAL LAB); Future    Screening for colon cancer    Orders:    Ambulatory Referral to Gastroenterology; Future    Other fatigue    Orders:    CBC and differential; Future    Comprehensive metabolic panel; Future    Hemoglobin A1C; Future    TSH, 3rd generation with Free T4 reflex; Future    Severe obesity (BMI 35.0-39.9) with comorbidity (HCC)    Orders:    Lipid panel; Future    Hemoglobin A1C; Future    Chronic gastritis without bleeding, unspecified gastritis type    Orders:    Ambulatory Referral to Gastroenterology; Future    omeprazole (PriLOSEC) 40 MG capsule; Take 1 capsule (40 mg total) by mouth 2 (two) times a day    Nonintractable episodic headache, unspecified headache type    Orders:    acetaminophen (TYLENOL) 650 mg CR tablet; Take 1 tablet (650 mg total) by mouth every 8 (eight) hours as needed for mild pain, moderate pain or headaches           History of Present Illness   Cough  This is a new problem. Episode onset: 2 weeks ago. The  problem has been gradually worsening. The problem occurs every few hours. The cough is Non-productive. Associated symptoms include chest pain (chest discomfort relieved by vomiting (once)) and heartburn. Pertinent negatives include no chills, ear congestion, ear pain, fever, headaches, hemoptysis, myalgias, nasal congestion, postnasal drip, rash, rhinorrhea, sore throat, shortness of breath, sweats, weight loss or wheezing. The symptoms are aggravated by lying down. He has tried nothing for the symptoms. There is no history of asthma, bronchiectasis, bronchitis, COPD, emphysema, environmental allergies or pneumonia.   Review of Systems   Constitutional:  Negative for chills, fever and weight loss.   HENT:  Negative for ear pain, postnasal drip, rhinorrhea and sore throat.    Eyes:  Negative for pain and visual disturbance.   Respiratory:  Positive for cough. Negative for hemoptysis, shortness of breath and wheezing.    Cardiovascular:  Positive for chest pain (chest discomfort relieved by vomiting (once)). Negative for palpitations.   Gastrointestinal:  Positive for abdominal pain, diarrhea (better now), heartburn and vomiting (once). Negative for constipation and nausea.   Genitourinary:  Negative for dysuria and hematuria.   Musculoskeletal:  Negative for arthralgias, back pain, myalgias, neck pain and neck stiffness.   Skin:  Negative for color change and rash.   Allergic/Immunologic: Negative for environmental allergies.   Neurological:  Negative for dizziness, seizures, syncope, weakness and headaches.   Psychiatric/Behavioral:  Negative for agitation, behavioral problems and confusion. The patient is not nervous/anxious.    All other systems reviewed and are negative.      Objective   /80 (BP Location: Right arm, Patient Position: Sitting, Cuff Size: Standard)   Pulse 70   Temp 98.2 °F (36.8 °C) (Tympanic)   Resp 20   Wt 94.3 kg (208 lb)   SpO2 98%   BMI 33.57 kg/m²      Physical Exam  Vitals  reviewed.   Constitutional:       General: He is not in acute distress.     Appearance: Normal appearance. He is not ill-appearing.   HENT:      Head: Normocephalic and atraumatic.      Right Ear: External ear normal.      Left Ear: External ear normal.      Nose: Nose normal. No congestion or rhinorrhea.      Mouth/Throat:      Mouth: Mucous membranes are moist.      Pharynx: Oropharynx is clear. No oropharyngeal exudate or posterior oropharyngeal erythema.   Eyes:      General:         Right eye: No discharge.         Left eye: No discharge.      Conjunctiva/sclera: Conjunctivae normal.   Cardiovascular:      Rate and Rhythm: Normal rate and regular rhythm.      Pulses: Normal pulses.      Heart sounds: Normal heart sounds. No murmur heard.     No friction rub. No gallop.   Pulmonary:      Effort: Pulmonary effort is normal. No respiratory distress.      Breath sounds: Normal breath sounds. No stridor. No wheezing, rhonchi or rales.   Chest:      Chest wall: No tenderness.   Abdominal:      General: Abdomen is flat. Bowel sounds are normal. There is no distension.      Palpations: Abdomen is soft.      Tenderness: There is no abdominal tenderness. There is no guarding.   Musculoskeletal:         General: No swelling, tenderness, deformity or signs of injury. Normal range of motion.      Cervical back: Normal range of motion and neck supple. No rigidity or tenderness.      Right lower leg: No edema.      Left lower leg: No edema.   Lymphadenopathy:      Cervical: No cervical adenopathy.   Skin:     General: Skin is warm and dry.      Capillary Refill: Capillary refill takes less than 2 seconds.      Findings: No bruising, erythema, lesion or rash.   Neurological:      General: No focal deficit present.      Mental Status: He is alert and oriented to person, place, and time. Mental status is at baseline.      Motor: No weakness.      Gait: Gait normal.      Deep Tendon Reflexes: Reflexes normal.   Psychiatric:          Mood and Affect: Mood normal.         Behavior: Behavior normal.         Thought Content: Thought content normal.

## 2025-02-03 ENCOUNTER — OFFICE VISIT (OUTPATIENT)
Age: 55
End: 2025-02-03

## 2025-02-03 VITALS
BODY MASS INDEX: 33.57 KG/M2 | SYSTOLIC BLOOD PRESSURE: 137 MMHG | RESPIRATION RATE: 20 BRPM | HEART RATE: 70 BPM | OXYGEN SATURATION: 98 % | DIASTOLIC BLOOD PRESSURE: 80 MMHG | TEMPERATURE: 98.2 F | WEIGHT: 208 LBS

## 2025-02-03 DIAGNOSIS — Z11.4 SCREENING FOR HIV (HUMAN IMMUNODEFICIENCY VIRUS): ICD-10-CM

## 2025-02-03 DIAGNOSIS — Z12.11 SCREENING FOR COLON CANCER: ICD-10-CM

## 2025-02-03 DIAGNOSIS — R51.9 NONINTRACTABLE EPISODIC HEADACHE, UNSPECIFIED HEADACHE TYPE: ICD-10-CM

## 2025-02-03 DIAGNOSIS — K29.50 CHRONIC GASTRITIS WITHOUT BLEEDING, UNSPECIFIED GASTRITIS TYPE: ICD-10-CM

## 2025-02-03 DIAGNOSIS — R53.83 OTHER FATIGUE: ICD-10-CM

## 2025-02-03 DIAGNOSIS — K21.9 GASTROESOPHAGEAL REFLUX DISEASE WITHOUT ESOPHAGITIS: Primary | ICD-10-CM

## 2025-02-03 DIAGNOSIS — E66.01 SEVERE OBESITY (BMI 35.0-39.9) WITH COMORBIDITY (HCC): ICD-10-CM

## 2025-02-03 DIAGNOSIS — Z11.59 NEED FOR HEPATITIS C SCREENING TEST: ICD-10-CM

## 2025-02-03 PROCEDURE — 99203 OFFICE O/P NEW LOW 30 MIN: CPT | Performed by: FAMILY MEDICINE

## 2025-02-03 RX ORDER — SENNOSIDES 8.6 MG
650 CAPSULE ORAL EVERY 8 HOURS PRN
Qty: 100 TABLET | Refills: 0 | Status: SHIPPED | OUTPATIENT
Start: 2025-02-03

## 2025-02-03 RX ORDER — OMEPRAZOLE 40 MG/1
40 CAPSULE, DELAYED RELEASE ORAL 2 TIMES DAILY
Qty: 180 CAPSULE | Refills: 0 | Status: SHIPPED | OUTPATIENT
Start: 2025-02-03 | End: 2025-05-04

## 2025-03-07 ENCOUNTER — TELEPHONE (OUTPATIENT)
Age: 55
End: 2025-03-07

## 2025-03-07 NOTE — TELEPHONE ENCOUNTER
Attempted to contact to reschedule missed appointment, no answer, left vm    Mailing no show letter

## 2025-04-08 DIAGNOSIS — R51.9 NONINTRACTABLE EPISODIC HEADACHE, UNSPECIFIED HEADACHE TYPE: ICD-10-CM

## 2025-04-08 RX ORDER — SENNOSIDES 8.6 MG
650 CAPSULE ORAL EVERY 8 HOURS PRN
Qty: 100 TABLET | Refills: 0 | Status: SHIPPED | OUTPATIENT
Start: 2025-04-08